# Patient Record
Sex: MALE | Race: WHITE
[De-identification: names, ages, dates, MRNs, and addresses within clinical notes are randomized per-mention and may not be internally consistent; named-entity substitution may affect disease eponyms.]

---

## 2021-11-22 PROBLEM — Z00.00 ENCOUNTER FOR PREVENTIVE HEALTH EXAMINATION: Status: ACTIVE | Noted: 2021-11-22

## 2021-12-02 ENCOUNTER — NON-APPOINTMENT (OUTPATIENT)
Age: 71
End: 2021-12-02

## 2021-12-02 ENCOUNTER — APPOINTMENT (OUTPATIENT)
Dept: ENDOCRINOLOGY | Facility: CLINIC | Age: 71
End: 2021-12-02
Payer: MEDICARE

## 2021-12-02 VITALS
BODY MASS INDEX: 25.34 KG/M2 | SYSTOLIC BLOOD PRESSURE: 171 MMHG | DIASTOLIC BLOOD PRESSURE: 99 MMHG | HEART RATE: 114 BPM | HEIGHT: 70 IN | WEIGHT: 177 LBS

## 2021-12-02 DIAGNOSIS — I10 ESSENTIAL (PRIMARY) HYPERTENSION: ICD-10-CM

## 2021-12-02 DIAGNOSIS — E78.5 HYPERLIPIDEMIA, UNSPECIFIED: ICD-10-CM

## 2021-12-02 DIAGNOSIS — Z82.3 FAMILY HISTORY OF STROKE: ICD-10-CM

## 2021-12-02 DIAGNOSIS — Z82.49 FAMILY HISTORY OF ISCHEMIC HEART DISEASE AND OTHER DISEASES OF THE CIRCULATORY SYSTEM: ICD-10-CM

## 2021-12-02 PROCEDURE — 99205 OFFICE O/P NEW HI 60 MIN: CPT

## 2021-12-02 RX ORDER — ROSUVASTATIN CALCIUM 5 MG/1
TABLET, FILM COATED ORAL
Refills: 0 | Status: ACTIVE | COMMUNITY

## 2021-12-02 RX ORDER — MONTELUKAST SODIUM 10 MG/1
TABLET, FILM COATED ORAL
Refills: 0 | Status: ACTIVE | COMMUNITY

## 2021-12-02 RX ORDER — PREDNISOLONE ACETATE 10 MG/ML
1 SUSPENSION/ DROPS OPHTHALMIC
Refills: 0 | Status: ACTIVE | COMMUNITY

## 2021-12-02 RX ORDER — PNV NO.95/FERROUS FUM/FOLIC AC 28MG-0.8MG
TABLET ORAL
Refills: 0 | Status: ACTIVE | COMMUNITY

## 2021-12-02 RX ORDER — CALCIUM CITRATE/VITAMIN D3 315MG-6.25
TABLET ORAL
Refills: 0 | Status: ACTIVE | COMMUNITY

## 2021-12-02 RX ORDER — CETIRIZINE HCL 10 MG
TABLET ORAL
Refills: 0 | Status: ACTIVE | COMMUNITY

## 2021-12-02 RX ORDER — CLOBETASOL PROPIONATE 0.5 MG/G
OINTMENT TOPICAL
Refills: 0 | Status: ACTIVE | COMMUNITY

## 2021-12-02 RX ORDER — CALCIUM CARBONATE/VITAMIN D3 600 MG-10
TABLET ORAL
Refills: 0 | Status: ACTIVE | COMMUNITY

## 2021-12-06 LAB
ALBUMIN SERPL ELPH-MCNC: 4.6 G/DL
ALP BLD-CCNC: 56 U/L
ALT SERPL-CCNC: 16 U/L
ANION GAP SERPL CALC-SCNC: 13 MMOL/L
AST SERPL-CCNC: 19 U/L
BILIRUB SERPL-MCNC: 0.4 MG/DL
BUN SERPL-MCNC: 13 MG/DL
CALCIUM SERPL-MCNC: 9.7 MG/DL
CHLORIDE SERPL-SCNC: 104 MMOL/L
CO2 SERPL-SCNC: 24 MMOL/L
CREAT SERPL-MCNC: 1.33 MG/DL
GLUCOSE SERPL-MCNC: 104 MG/DL
MAGNESIUM SERPL-MCNC: 2.2 MG/DL
PHOSPHATE SERPL-MCNC: 3.9 MG/DL
POTASSIUM SERPL-SCNC: 4.6 MMOL/L
PROT SERPL-MCNC: 6.7 G/DL
SODIUM SERPL-SCNC: 141 MMOL/L

## 2021-12-07 LAB
25(OH)D3 SERPL-MCNC: 48.1 NG/ML
CALCIUM SERPL-MCNC: 9.7 MG/DL
CAU: 10 MG/DL
CREAT 24H UR-MCNC: 1.4 G/24 H
CREAT ?TM UR-MCNC: 80 MG/DL
PARATHYROID HORMONE INTACT: 45 PG/ML
PROT ?TM UR-MCNC: 24 HR
SPECIMEN VOL 24H UR: 175 MG/24 H
SPECIMEN VOL 24H UR: 1750 ML
TESTOST SERPL-MCNC: 297 NG/DL
TSH SERPL-ACNC: 6.67 UIU/ML
TTG IGA SER IA-ACNC: 3.9 U/ML
TTG IGA SER-ACNC: NEGATIVE
TTG IGG SER IA-ACNC: 5.2 U/ML
TTG IGG SER IA-ACNC: NEGATIVE

## 2021-12-08 ENCOUNTER — TRANSCRIPTION ENCOUNTER (OUTPATIENT)
Age: 71
End: 2021-12-08

## 2021-12-08 LAB
ALBUPE: 14.9 %
ALPHA1UPE: 36.8 %
ALPHA2UPE: 21.1 %
BETAUPE: 17.9 %
GAMMAUPE: 9.3 %
IGA 24H UR QL IFE: NORMAL
KAPPA LC 24H UR QL: NORMAL
PROT PATTERN 24H UR ELPH-IMP: NORMAL
PROT UR-MCNC: 6 MG/DL
PROT UR-MCNC: 6 MG/DL

## 2021-12-09 ENCOUNTER — TRANSCRIPTION ENCOUNTER (OUTPATIENT)
Age: 71
End: 2021-12-09

## 2021-12-09 LAB
ALBUMIN MFR SERPL ELPH: 63.8 %
ALBUMIN SERPL-MCNC: 4.3 G/DL
ALBUMIN/GLOB SERPL: 1.8 RATIO
ALP BONE SERPL-MCNC: 10.6 UG/L
ALPHA1 GLOB MFR SERPL ELPH: 3 %
ALPHA1 GLOB SERPL ELPH-MCNC: 0.2 G/DL
ALPHA2 GLOB MFR SERPL ELPH: 9.5 %
ALPHA2 GLOB SERPL ELPH-MCNC: 0.6 G/DL
B-GLOBULIN MFR SERPL ELPH: 12.2 %
B-GLOBULIN SERPL ELPH-MCNC: 0.8 G/DL
COLLAGEN CTX SERPL-MCNC: 246 PG/ML
GAMMA GLOB FLD ELPH-MCNC: 0.8 G/DL
GAMMA GLOB MFR SERPL ELPH: 11.5 %
INTERPRETATION SERPL IEP-IMP: NORMAL
PROT SERPL-MCNC: 6.7 G/DL
PROT SERPL-MCNC: 6.7 G/DL

## 2022-01-07 ENCOUNTER — TRANSCRIPTION ENCOUNTER (OUTPATIENT)
Age: 72
End: 2022-01-07

## 2022-01-19 ENCOUNTER — TRANSCRIPTION ENCOUNTER (OUTPATIENT)
Age: 72
End: 2022-01-19

## 2022-01-19 RX ORDER — ALENDRONATE SODIUM 70 MG/1
70 TABLET ORAL
Qty: 12 | Refills: 3 | Status: ACTIVE | COMMUNITY
Start: 2022-01-19 | End: 1900-01-01

## 2022-02-18 ENCOUNTER — TRANSCRIPTION ENCOUNTER (OUTPATIENT)
Age: 72
End: 2022-02-18

## 2022-02-21 ENCOUNTER — NON-APPOINTMENT (OUTPATIENT)
Age: 72
End: 2022-02-21

## 2022-02-24 LAB
ALBUMIN SERPL ELPH-MCNC: 4.6 G/DL
ALP BLD-CCNC: 51 U/L
ALT SERPL-CCNC: 16 U/L
ANION GAP SERPL CALC-SCNC: 14 MMOL/L
AST SERPL-CCNC: 24 U/L
BILIRUB SERPL-MCNC: 0.7 MG/DL
BUN SERPL-MCNC: 17 MG/DL
CALCIUM SERPL-MCNC: 9.8 MG/DL
CHLORIDE SERPL-SCNC: 101 MMOL/L
CO2 SERPL-SCNC: 22 MMOL/L
CREAT SERPL-MCNC: 1.25 MG/DL
GLUCOSE SERPL-MCNC: 95 MG/DL
POTASSIUM SERPL-SCNC: 4.3 MMOL/L
PROT SERPL-MCNC: 6.7 G/DL
SODIUM SERPL-SCNC: 137 MMOL/L
T3 SERPL-MCNC: 97 NG/DL
T4 FREE SERPL-MCNC: 1.1 NG/DL
T4 SERPL-MCNC: 6.1 UG/DL
THYROGLOB AB SERPL-ACNC: <20 IU/ML
THYROPEROXIDASE AB SERPL IA-ACNC: 125 IU/ML
TSH SERPL-ACNC: 5.01 UIU/ML

## 2022-02-24 NOTE — DATA REVIEWED
[FreeTextEntry1] : Laboratories (August 17, 2021) reviewed and significant for: \par Unremarkable complete blood count\par \par Most recent bone mineral density\par Date: June 2, 2021\par Source: Hologic\par Site: [facility]\par \par Site	BMD (g/cm2)	T-score	Change previous	Change baseline	\par Lumbar spine	0.856	-2.1\par Femoral neck	0.549	-2.8	\par Total hip	                0.699       -2.2         \par Distal radius           	                \par DXA Comments:

## 2022-02-24 NOTE — ASSESSMENT
[Bisphosphonates] : The patient was instructed to take bisphosphonates on an empty stomach with a full glass of water,and wait at least 30 minutes before eating or lying down [FreeTextEntry1] : Osteoporosis. He has no history of fragility fracture. He has no history of prior osteoporosis therapy. We discussed completion of a metabolic evaluation for secondary causes of bone loss. We discussed the potential benefits and risks of antiresorptive osteoporosis therapy at length, including but not limited to osteonecrosis of the jaw and atypical femoral fracture. He is amenable to therapy with alendronate pending further evaluation. We discussed proper use and compliance with alendronate. We can consider switching therapy to zoledronic acid if he prefers once his dental work is complete.\par Metabolic evaluation for secondary causes of osteoporosis\par Check 24 hour urine calcium to evaluate for hypercalciuria and calcium sufficiency\par Calcium 1200 mg daily from diet and supplements (to be taken in divided doses as no more than 500-600 mg can be absorbed at one time); continue current regimen\par Continue current vitamin D regimen pending level\par Diet, exercise and fall prevention discussed\par \par I reviewed the DXA performed on June 2, 2021 with the patient today.\par I reviewed the laboratories performed on August 17, 2021 with the patient today. \par I counseled the patient regarding calcium and vitamin D intake today.\par I discussed the following osteoporosis therapies: Alendronate, risedronate, ibandronate, zoledronic acid, denosumab\par \par CC:\par Dr. Vikash Light, Fax [phone]

## 2022-02-24 NOTE — ADDENDUM
[FreeTextEntry1] : Recent laboratory results as below, overall unremarkable for a secondary cause of bone loss. eGFR borderline low and recommend follow-up with primary care. TSH above the upper limit of normal but below 10 uIU/mL; recommend repeat thyroid panel in 3 months with antibody testing. 24 hour urine calcium within the standard reference range of  mg. Other test results within range. We can start alendronate 70 mg weekly. I left a telephone message for Mr. Argueta to discuss and will send a Portal message with results. 12/09/21\par \par Recent laboratory results as below. eGFR borderline low as per previous. TSH above the upper limit of normal but below 10 uIU/mL. T4/T3 within range. Thyroid peroxidase antibody positive. I will discuss with Mr. Argueta at his upcoming appointment. 2/24/22

## 2022-02-24 NOTE — HISTORY OF PRESENT ILLNESS
[FreeTextEntry1] : Mr. Argueta is a 71 year-old man presenting for evaluation of osteoporosis. He teaches film. He is accompanied by his partner, Mary Ann.\par \par Bone History\par Osteoporosis diagnosed in 2021 on routine bone density significant for T-scores of -2.1 at the lumbar spine, -2.8 at the femoral neck, -2.2 at the total hip; demineralization was noted on chest radiograph, prompting bone density testing\par Fracture history: Childhood rib fractures; hairline fracture in left foot in 1984 while running in cowboy boots\par Family history: No parental history of hip fracture\par Treatment: None\par \par Falls: No\par Height loss: Perhaps 1/2 inch\par Kidney stones: No\par Dental health: Irregular appointments, no history of implants; may need upcoming dental implants\par Exercise: Walks; 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

## 2022-02-24 NOTE — PHYSICAL EXAM
[Alert] : alert [Healthy Appearance] : healthy appearance [No Acute Distress] : no acute distress [Normal Sclera/Conjunctiva] : normal sclera/conjunctiva [Normal Hearing] : hearing was normal [No Neck Mass] : no neck mass was observed [No LAD] : no lymphadenopathy [Supple] : the neck was supple [Thyroid Not Enlarged] : the thyroid was not enlarged [No Respiratory Distress] : no respiratory distress [Clear to Auscultation] : lungs were clear to auscultation bilaterally [Normal S1, S2] : normal S1 and S2 [Normal Rate] : heart rate was normal [Regular Rhythm] : with a regular rhythm [No Spinal Tenderness] : no spinal tenderness [No Stigmata of Cushings Syndrome] : no stigmata of Cushings Syndrome [Normal Gait] : normal gait [Normal Insight/Judgement] : insight and judgment were intact [Kyphosis] : no kyphosis present [Scoliosis] : no scoliosis [Acanthosis Nigricans] : no acanthosis nigricans [de-identified] : no moon facies, no supraclavicular fat pads

## 2022-02-25 ENCOUNTER — APPOINTMENT (OUTPATIENT)
Dept: ENDOCRINOLOGY | Facility: CLINIC | Age: 72
End: 2022-02-25
Payer: MEDICARE

## 2022-02-25 VITALS
DIASTOLIC BLOOD PRESSURE: 95 MMHG | SYSTOLIC BLOOD PRESSURE: 145 MMHG | WEIGHT: 172 LBS | HEART RATE: 96 BPM | BODY MASS INDEX: 24.68 KG/M2

## 2022-02-25 PROCEDURE — 99214 OFFICE O/P EST MOD 30 MIN: CPT

## 2022-03-02 ENCOUNTER — TRANSCRIPTION ENCOUNTER (OUTPATIENT)
Age: 72
End: 2022-03-02

## 2022-03-02 LAB — COLLAGEN CTX SERPL-MCNC: 159 PG/ML

## 2022-03-02 NOTE — ASSESSMENT
[Bisphosphonates] : The patient was instructed to take bisphosphonates on an empty stomach with a full glass of water,and wait at least 30 minutes before eating or lying down [FreeTextEntry1] : Osteoporosis. He has no history of fragility fracture. He has received alendronate from 2022 to present. Metabolic evaluation for secondary causes of bone loss previously unremarkable. We discussed the potential benefits and risks of antiresorptive osteoporosis therapy at length, including but not limited to osteonecrosis of the jaw and atypical femoral fracture. He is tolerating alendronate and we will continue. I advised he can hold alendronate for up to 8 weeks if recommended by his dentist following implant placement. \par Continue alendronate 70 mg weekly\par Calcium 1200 mg daily from diet and supplements (to be taken in divided doses as no more than 500-600 mg can be absorbed at one time); continue current regimen\par Continue current vitamin D regimen\par Diet, exercise and fall prevention discussed\par \par Subclinical hypothyroidism. He has been noted to have TSH values above her upper limit of normal but below 10 uIU/mL. Thyroid peroxidase antibody positive. He has been clinically euthyroid. There is no clear indication to treat with levothyroxine unless TSH rises above 10 uIU/mL. We should plan to monitor his TSH annually or earlier if clinically indicated. \par \par Next bone density testin year\par Next appointment: 1 year or earlier as needed\par \par I reviewed the DXA performed on 2021 with the patient today.\par I reviewed the laboratories performed from  to present with the patient today. \par I counseled the patient regarding calcium and vitamin D intake today.\par I discussed the following osteoporosis therapies: Alendronate\par \par CC:\par Dr. Vikash Light, Fax [phone]

## 2022-03-02 NOTE — HISTORY OF PRESENT ILLNESS
[FreeTextEntry1] : Mr. Argueta is a 71 year-old man presenting for follow-up of osteoporosis and subclinical hypothyroidism. I saw him for an initial visit in December 2021. 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, Mary Ann.\par \par Bone History\par Osteoporosis diagnosed in 2021 on routine bone density significant for T-scores of -2.1 at the lumbar spine, -2.8 at the femoral neck, -2.2 at the total hip; demineralization was noted on chest radiograph, prompting bone density testing\par Fracture history: Childhood rib fractures; hairline fracture in left foot in 1984 while running in cowboy boots\par Family history: No parental history of hip fracture\par Treatment: Alendronate 70 mg weekly from January 2022 to present\par \par Falls: No\par Height loss: Perhaps 1/2 inch\par Kidney stones: No\par Dental health: Irregular appointments, no history of implants; may need upcoming dental implants\par Exercise: Walks; 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; physical therapy for bone health\par Dairy intake: 1-2 serving daily (milk and/or cheese daily)\par Calcium supplements: Calcium carbonate 600 mg daily\par Multivitamin: None\par Vitamin D supplements: 2000 intl units\par \par Osteoporosis risk factors include: Postmenopausal status,  race, prior fracture, falls, height loss, small thin bones, tobacco use, excessive alcohol, anorexia, family history, vitamin D deficiency, corticosteroid use, seizure medications, malabsorption, hyperparathyroidism, hyperthyroidism.\par NEGATIVE EXCEPT:  race\par \par Subclinical hypothyroidism.\par He has been noted to have at least two TSH values above her upper limit of normal but below 10 uIU/mL. Thyroid peroxidase antibody positive.\par He has been clinically euthyroid.\par No history of radiation exposure other than medical imaging.\par No known family history of thyroid disease.\par \par Interim History \par Laboratory results from his initial visit as below, overall unremarkable for a secondary cause of bone loss. eGFR borderline low and recommended follow-up with primary care. TSH above the upper limit of normal but below 10 uIU/mL; recommended repeat thyroid panel in 3 months with antibody testing. 24 hour urine calcium within the standard reference range of  mg. Other test results within range. \par He started alendronate 70 mg weekly in January 2022. He is taking as prescribed and tolerating well. \par Recent laboratory results as below. eGFR borderline low as per previous. TSH above the upper limit of normal but below 10 uIU/mL. T4/T3 within range. Thyroid peroxidase antibody positive.\par Medical and surgical history, medications, allergies, social and family history reviewed and updated as needed.

## 2022-03-02 NOTE — ADDENDUM
[FreeTextEntry1] : C-telopeptide has decreased 35% with alendronate, which is a good result. 3/02/22

## 2022-03-02 NOTE — PHYSICAL EXAM
[Alert] : alert [Healthy Appearance] : healthy appearance [No Acute Distress] : no acute distress [Normal Sclera/Conjunctiva] : normal sclera/conjunctiva [Normal Hearing] : hearing was normal [No Respiratory Distress] : no respiratory distress [No Spinal Tenderness] : no spinal tenderness [No Stigmata of Cushings Syndrome] : no stigmata of Cushings Syndrome [Normal Gait] : normal gait [Normal Insight/Judgement] : insight and judgment were intact [Kyphosis] : no kyphosis present [Scoliosis] : no scoliosis [Acanthosis Nigricans] : no acanthosis nigricans [de-identified] : no moon facies, no supraclavicular fat pads

## 2022-11-29 ENCOUNTER — TRANSCRIPTION ENCOUNTER (OUTPATIENT)
Age: 72
End: 2022-11-29

## 2022-12-06 ENCOUNTER — TRANSCRIPTION ENCOUNTER (OUTPATIENT)
Age: 72
End: 2022-12-06

## 2022-12-07 ENCOUNTER — NON-APPOINTMENT (OUTPATIENT)
Age: 72
End: 2022-12-07

## 2022-12-14 ENCOUNTER — APPOINTMENT (OUTPATIENT)
Dept: RADIOLOGY | Facility: CLINIC | Age: 72
End: 2022-12-14

## 2022-12-14 ENCOUNTER — RESULT REVIEW (OUTPATIENT)
Age: 72
End: 2022-12-14

## 2022-12-14 ENCOUNTER — OUTPATIENT (OUTPATIENT)
Dept: OUTPATIENT SERVICES | Facility: HOSPITAL | Age: 72
LOS: 1 days | End: 2022-12-14

## 2022-12-14 PROCEDURE — 77085 DXA BONE DENSITY AXL VRT FX: CPT | Mod: 26

## 2022-12-16 ENCOUNTER — NON-APPOINTMENT (OUTPATIENT)
Age: 72
End: 2022-12-16

## 2022-12-20 ENCOUNTER — APPOINTMENT (OUTPATIENT)
Dept: ENDOCRINOLOGY | Facility: CLINIC | Age: 72
End: 2022-12-20

## 2023-04-27 ENCOUNTER — APPOINTMENT (OUTPATIENT)
Dept: ENDOCRINOLOGY | Facility: CLINIC | Age: 73
End: 2023-04-27
Payer: MEDICARE

## 2023-04-27 VITALS
HEART RATE: 93 BPM | SYSTOLIC BLOOD PRESSURE: 167 MMHG | BODY MASS INDEX: 22.9 KG/M2 | DIASTOLIC BLOOD PRESSURE: 96 MMHG | WEIGHT: 160 LBS | HEIGHT: 70 IN

## 2023-04-27 DIAGNOSIS — M81.0 AGE-RELATED OSTEOPOROSIS W/OUT CURRENT PATHOLOGICAL FRACTURE: ICD-10-CM

## 2023-04-27 DIAGNOSIS — L40.9 PSORIASIS, UNSPECIFIED: ICD-10-CM

## 2023-04-27 DIAGNOSIS — S32.000A WEDGE COMPRESSION FRACTURE OF UNSPECIFIED LUMBAR VERTEBRA, INITIAL ENCOUNTER FOR CLOSED FRACTURE: ICD-10-CM

## 2023-04-27 DIAGNOSIS — E03.8 OTHER SPECIFIED HYPOTHYROIDISM: ICD-10-CM

## 2023-04-27 PROCEDURE — 99214 OFFICE O/P EST MOD 30 MIN: CPT

## 2023-04-27 RX ORDER — AZELASTINE HYDROCHLORIDE 137 UG/1
SPRAY, METERED NASAL
Refills: 0 | Status: DISCONTINUED | COMMUNITY
End: 2023-04-27

## 2023-04-27 NOTE — DATA REVIEWED
[FreeTextEntry1] : Laboratories (February 9, 2023) reviewed and significant for: \par Unremarkable complete blood count\par calcium 9.4 mg/dL (albumin 4.6 g/dL)\par BUN/creatinine 18/1.05 mg/dL (eGFR 76 mL/min)\par Alkaline phosphatase 46 U/L\par TSH 3.56 uIU/mL\par \par Most recent bone mineral density\par Date: December 14, 2022\par Source: [facility]\par Site: [facility]\par \par Site	BMD	T-score	Change previous	Change baseline	\par Lumbar spine	0.883	-1.9			\par Femoral neck	0.540	-2.9			\par Total hip 	                0.805	-1.5			\par Distal radius	0.729	-1.7			\par DXA comments: Baseline scan at this facility\par Vertebral fracture assessment with evidence of compression fracture at L4; T10-L5 visualized (my read). \par \par Impression: I have personally reviewed the DXA images and report, and compared these to a report from June 1, 2021 from [facility]. There is osteoporosis by the World Health Organization criteria. While not directly comparable, there is a possible improvement at the lumbar spine and total hip from previous (previous T-scores -2.1 at the lumbar spine, -2.8 at the femoral neck, and -2.2 at the total hip). Vertebral fracture assessment with evidence of compression fracture at L4.

## 2023-04-27 NOTE — PHYSICAL EXAM
[Alert] : alert [Healthy Appearance] : healthy appearance [No Acute Distress] : no acute distress [Normal Sclera/Conjunctiva] : normal sclera/conjunctiva [Normal Hearing] : hearing was normal [No Respiratory Distress] : no respiratory distress [No Spinal Tenderness] : no spinal tenderness [Kyphosis] : no kyphosis present [Scoliosis] : no scoliosis [No Stigmata of Cushings Syndrome] : no stigmata of Cushings Syndrome [Normal Gait] : normal gait [Acanthosis Nigricans] : no acanthosis nigricans [Normal Insight/Judgement] : insight and judgment were intact [de-identified] : no moon facies, no supraclavicular fat pads

## 2023-04-27 NOTE — ASSESSMENT
[FreeTextEntry1] : Osteoporosis. He has a history of a lumbar compression fracture noted on vertebral fracture assessment with most recent bone density testing. He has received alendronate from 2022 to present. Metabolic evaluation for secondary causes of bone loss previously unremarkable. We discussed the potential benefits and risks of the osteoanabolic and antiresorptive classes of pharmacologic osteoporosis therapy. We discussed the risks and benefits of PTH/PTHrP analog therapy, including but not limited to hypercalcemia, nausea, orthostatic hypotension, osteosarcoma. We discussed the potential benefits and risks of antiresorptive osteoporosis therapy, including but not limited to osteonecrosis of the jaw and atypical femoral fracture. He will consider his options for pharmacologic osteoporosis therapy and whether to make a switch from alendronate; he is leaning towards denosumab and will contact me with his decision. \par Continue alendronate 70 mg weekly for now\par Calcium 8305-2425 mg daily from diet and supplements (to be taken in divided doses as no more than 500-600 mg can be absorbed at one time); continue current regimen\par Continue current vitamin D regimen pending level with next blood tests\par Diet, exercise and fall prevention discussed\par \par Subclinical hypothyroidism. He has been noted to have TSH values above her upper limit of normal but below 10 uIU/mL. Thyroid peroxidase antibody positive. He has been clinically euthyroid. There is no clear indication to treat with levothyroxine unless TSH rises above 10 uIU/mL. We should plan to monitor his TSH annually or earlier if clinically indicated. \par \par Next bone density testin year\par Next appointment: 1 year or earlier as needed\par \par I reviewed the DXA performed on 2022 with the patient today.\par I reviewed the laboratories performed on 2023 with the patient today. \par I counseled the patient regarding calcium and vitamin D intake today.\par I discussed the following osteoporosis therapies: Alendronate, risedronate, ibandronate, zoledronic acid, denosumab, teriparatide, abaloparatide\par \par CC:\par Dr. Vikash Light, Fax [phone]

## 2023-04-27 NOTE — HISTORY OF PRESENT ILLNESS
[FreeTextEntry1] : Mr. Argueta is a 73 year-old man presenting for follow-up of osteoporosis and subclinical hypothyroidism. I saw him for an initial visit in December 2021 and last in February 2022. 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, Mary Ann.\par \par Bone History\par Osteoporosis diagnosed in 2021 on routine bone density significant for T-scores of -2.1 at the lumbar spine, -2.8 at the femoral neck, and -2.2 at the total hip; most recent bone density as below\par Fracture history: Childhood rib fractures; hairline fracture in left foot in 1984 while running in cowboy boots; L4 compression fracture noted on vertebral fracture assessment in 2022\par Family history: No parental history of hip fracture\par Treatment: Alendronate 70 mg weekly from January 2022 to present\par \par Falls: No\par Height loss: Perhaps 1/2 inch\par Kidney stones: No\par Dental health: Irregular appointments, no history of implants, no upcoming procedures planned\par Exercise: Walks; 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\par Dairy intake: 1-2 serving daily (milk and/or cheese daily)\par Calcium supplements: Calcium carbonate 600 mg daily\par Multivitamin: None\par Vitamin D supplements: 2000 intl units\par \par Osteoporosis risk factors include: Postmenopausal status,  race, prior fracture, falls, height loss, small thin bones, tobacco use, excessive alcohol, anorexia, family history, vitamin D deficiency, corticosteroid use, seizure medications, malabsorption, hyperparathyroidism, hyperthyroidism.\par NEGATIVE EXCEPT:  race, prior fracture\par \par Subclinical hypothyroidism.\par He has been noted to have at least two TSH values above her upper limit of normal but below 10 uIU/mL. Thyroid peroxidase antibody positive.\par He has been clinically euthyroid.\par No history of radiation exposure other than medical imaging.\par No known family history of thyroid disease.\par \par Interim History \par He started alendronate 70 mg weekly in January 2022. He is taking as prescribed and tolerating well. \par Recent bone density as below. There is osteoporosis by the World Health Organization criteria. While not directly comparable, there is a possible improvement at the lumbar spine and total hip from previous (previous T-scores -2.1 at the lumbar spine, -2.8 at the femoral neck, and -2.2 at the total hip). Vertebral fracture assessment with evidence of an age-indeterminate compression fracture at L4. \par Recent laboratory results ordered by his primary care provider significant for the below; see scanned results. Serum calcium, renal function, and TSH within the normal range. \par Medical and surgical history, medications, allergies, social and family history reviewed and updated as needed.

## 2023-05-04 ENCOUNTER — TRANSCRIPTION ENCOUNTER (OUTPATIENT)
Age: 73
End: 2023-05-04

## 2024-02-07 ENCOUNTER — OUTPATIENT (OUTPATIENT)
Dept: OUTPATIENT SERVICES | Facility: HOSPITAL | Age: 74
LOS: 1 days | End: 2024-02-07

## 2024-02-07 ENCOUNTER — APPOINTMENT (OUTPATIENT)
Dept: RADIOLOGY | Facility: CLINIC | Age: 74
End: 2024-02-07
Payer: MEDICARE

## 2024-02-07 PROCEDURE — 77080 DXA BONE DENSITY AXIAL: CPT | Mod: 26

## 2024-06-05 ENCOUNTER — APPOINTMENT (OUTPATIENT)
Dept: ENDOCRINOLOGY | Facility: CLINIC | Age: 74
End: 2024-06-05